# Patient Record
Sex: MALE | Race: WHITE | NOT HISPANIC OR LATINO
[De-identification: names, ages, dates, MRNs, and addresses within clinical notes are randomized per-mention and may not be internally consistent; named-entity substitution may affect disease eponyms.]

---

## 2017-06-01 ENCOUNTER — APPOINTMENT (OUTPATIENT)
Dept: CARDIOLOGY | Facility: CLINIC | Age: 67
End: 2017-06-01

## 2017-06-01 VITALS
DIASTOLIC BLOOD PRESSURE: 80 MMHG | SYSTOLIC BLOOD PRESSURE: 137 MMHG | BODY MASS INDEX: 28.92 KG/M2 | HEIGHT: 70 IN | WEIGHT: 202 LBS | OXYGEN SATURATION: 96 % | HEART RATE: 62 BPM

## 2017-06-02 ENCOUNTER — NON-APPOINTMENT (OUTPATIENT)
Age: 67
End: 2017-06-02

## 2017-09-04 ENCOUNTER — TRANSCRIPTION ENCOUNTER (OUTPATIENT)
Age: 67
End: 2017-09-04

## 2017-11-30 ENCOUNTER — APPOINTMENT (OUTPATIENT)
Dept: CARDIOLOGY | Facility: CLINIC | Age: 67
End: 2017-11-30
Payer: MEDICARE

## 2017-11-30 VITALS
BODY MASS INDEX: 29.35 KG/M2 | OXYGEN SATURATION: 98 % | HEIGHT: 70 IN | SYSTOLIC BLOOD PRESSURE: 147 MMHG | WEIGHT: 205 LBS | HEART RATE: 54 BPM | DIASTOLIC BLOOD PRESSURE: 87 MMHG

## 2017-11-30 PROCEDURE — 93000 ELECTROCARDIOGRAM COMPLETE: CPT

## 2017-11-30 PROCEDURE — 99214 OFFICE O/P EST MOD 30 MIN: CPT | Mod: 25

## 2017-11-30 RX ORDER — TELMISARTAN 40 MG/1
40 TABLET ORAL DAILY
Refills: 0 | Status: ACTIVE | COMMUNITY

## 2017-12-06 ENCOUNTER — NON-APPOINTMENT (OUTPATIENT)
Age: 67
End: 2017-12-06

## 2018-06-07 ENCOUNTER — APPOINTMENT (OUTPATIENT)
Dept: CARDIOLOGY | Facility: CLINIC | Age: 68
End: 2018-06-07
Payer: MEDICARE

## 2018-06-07 VITALS — HEART RATE: 70 BPM | OXYGEN SATURATION: 97 % | DIASTOLIC BLOOD PRESSURE: 75 MMHG | SYSTOLIC BLOOD PRESSURE: 130 MMHG

## 2018-06-07 VITALS — HEIGHT: 70 IN | BODY MASS INDEX: 29.49 KG/M2 | WEIGHT: 206 LBS

## 2018-06-07 PROCEDURE — 93000 ELECTROCARDIOGRAM COMPLETE: CPT

## 2018-06-07 PROCEDURE — 99214 OFFICE O/P EST MOD 30 MIN: CPT | Mod: 25

## 2018-06-12 ENCOUNTER — NON-APPOINTMENT (OUTPATIENT)
Age: 68
End: 2018-06-12

## 2018-11-16 ENCOUNTER — APPOINTMENT (OUTPATIENT)
Dept: CARDIOLOGY | Facility: CLINIC | Age: 68
End: 2018-11-16

## 2018-12-05 ENCOUNTER — APPOINTMENT (OUTPATIENT)
Dept: CARDIOLOGY | Facility: CLINIC | Age: 68
End: 2018-12-05
Payer: MEDICARE

## 2018-12-05 PROCEDURE — 93306 TTE W/DOPPLER COMPLETE: CPT

## 2018-12-06 ENCOUNTER — APPOINTMENT (OUTPATIENT)
Dept: CARDIOLOGY | Facility: CLINIC | Age: 68
End: 2018-12-06

## 2019-02-10 ENCOUNTER — EMERGENCY (EMERGENCY)
Facility: HOSPITAL | Age: 69
LOS: 1 days | Discharge: HOME | End: 2019-02-10
Attending: EMERGENCY MEDICINE | Admitting: EMERGENCY MEDICINE
Payer: MEDICARE

## 2019-02-10 VITALS
SYSTOLIC BLOOD PRESSURE: 166 MMHG | TEMPERATURE: 98 F | DIASTOLIC BLOOD PRESSURE: 100 MMHG | HEART RATE: 68 BPM | OXYGEN SATURATION: 98 % | RESPIRATION RATE: 18 BRPM

## 2019-02-10 VITALS
WEIGHT: 199.96 LBS | OXYGEN SATURATION: 99 % | DIASTOLIC BLOOD PRESSURE: 85 MMHG | RESPIRATION RATE: 14 BRPM | HEIGHT: 71 IN | SYSTOLIC BLOOD PRESSURE: 159 MMHG | HEART RATE: 67 BPM | TEMPERATURE: 97 F

## 2019-02-10 PROCEDURE — 99283 EMERGENCY DEPT VISIT LOW MDM: CPT

## 2019-02-10 RX ORDER — OFLOXACIN 0.3 %
2 DROPS OPHTHALMIC (EYE) ONCE
Qty: 0 | Refills: 0 | Status: DISCONTINUED | OUTPATIENT
Start: 2019-02-10 | End: 2019-02-14

## 2019-02-10 NOTE — ED PROVIDER NOTE - EYES, MLM
Clear bilaterally, pupils equal, round and reactive to light. EOM intact. fundoscopically normal. negative sidels. fluorescein exam is normal with no foreign body noted.

## 2019-02-10 NOTE — ED ADULT NURSE NOTE - CHPI ED NUR SYMPTOMS NEG
no eye lid swelling/no itching/no purulent drainage/no discharge/no double vision/no drainage/no photophobia/no blurred vision

## 2019-02-10 NOTE — ED PROVIDER NOTE - OBJECTIVE STATEMENT
67 y/o M pt presents to the ED c/o foreign body sensation in the left eye since yesterday afternoon. Pt states that he is a  and he was grinding steel yesterday. He was wearing safety glasses at the time. He states that the sensation has improved since yesterday. Similar sx in the past with metal foreign body in the eye. Denies decrease in vision or discharge from the eye. No other complaints at this time.

## 2019-02-10 NOTE — ED ADULT NURSE NOTE - OBJECTIVE STATEMENT
piece of metal to left eye h/o above multiple times in past redder yesterday did not see anything in eye but with pains and feels as if something there minimal redness no visual deficits no gross f.b. seen

## 2019-02-10 NOTE — ED PROVIDER NOTE - MEDICAL DECISION MAKING DETAILS
Foreign body in eye yesterday. PE - no fb visualized. Cornea normal. Plan - Ocuflox, Ophth fu tomorrow.

## 2019-04-09 ENCOUNTER — APPOINTMENT (OUTPATIENT)
Dept: CARDIOLOGY | Facility: CLINIC | Age: 69
End: 2019-04-09

## 2019-04-09 ENCOUNTER — NON-APPOINTMENT (OUTPATIENT)
Age: 69
End: 2019-04-09

## 2019-04-09 ENCOUNTER — APPOINTMENT (OUTPATIENT)
Dept: CARDIOLOGY | Facility: CLINIC | Age: 69
End: 2019-04-09
Payer: MEDICARE

## 2019-04-09 VITALS
HEIGHT: 70 IN | HEART RATE: 17 BPM | OXYGEN SATURATION: 99 % | WEIGHT: 227 LBS | DIASTOLIC BLOOD PRESSURE: 85 MMHG | SYSTOLIC BLOOD PRESSURE: 171 MMHG | BODY MASS INDEX: 32.5 KG/M2

## 2019-04-09 VITALS — DIASTOLIC BLOOD PRESSURE: 98 MMHG | SYSTOLIC BLOOD PRESSURE: 154 MMHG

## 2019-04-09 DIAGNOSIS — N21.0 CALCULUS IN BLADDER: ICD-10-CM

## 2019-04-09 DIAGNOSIS — I25.84 ATHEROSCLEROTIC HEART DISEASE OF NATIVE CORONARY ARTERY W/OUT ANGINA PECTORIS: ICD-10-CM

## 2019-04-09 DIAGNOSIS — Z86.79 PERSONAL HISTORY OF OTHER DISEASES OF THE CIRCULATORY SYSTEM: ICD-10-CM

## 2019-04-09 DIAGNOSIS — I77.810 THORACIC AORTIC ECTASIA: ICD-10-CM

## 2019-04-09 DIAGNOSIS — I10 ESSENTIAL (PRIMARY) HYPERTENSION: ICD-10-CM

## 2019-04-09 DIAGNOSIS — I25.10 ATHEROSCLEROTIC HEART DISEASE OF NATIVE CORONARY ARTERY W/OUT ANGINA PECTORIS: ICD-10-CM

## 2019-04-09 DIAGNOSIS — I35.1 NONRHEUMATIC AORTIC (VALVE) INSUFFICIENCY: ICD-10-CM

## 2019-04-09 DIAGNOSIS — E66.9 OBESITY, UNSPECIFIED: ICD-10-CM

## 2019-04-09 PROCEDURE — 93000 ELECTROCARDIOGRAM COMPLETE: CPT

## 2019-04-09 PROCEDURE — 99214 OFFICE O/P EST MOD 30 MIN: CPT | Mod: 25

## 2019-04-09 NOTE — HISTORY OF PRESENT ILLNESS
[FreeTextEntry1] : Puh Hearn is a 69-year-old man with a history of hypertension, aortic regurgitation, dilated aortic root, coronary artery calcification, who returns for routine cardiac examination - his last visit was with my associate, Dr. Fraire, in June 2018.  He continues to feel well and offers no complaints. He describes regular and intense aerobic exercise.  He has not been experiencing angina, dyspnea, or palpitations.

## 2019-04-09 NOTE — REASON FOR VISIT
[Follow-Up - Clinic] : a clinic follow-up of [Hypertension] : hypertension [Medication Management] : Medication management [FreeTextEntry1] : aortic insufficiency

## 2019-04-09 NOTE — PHYSICAL EXAM
[FreeTextEntry1] : Obese [Eyelids - No Xanthelasma] : the eyelids demonstrated no xanthelasmas [No Oral Cyanosis] : no oral cyanosis [] : no respiratory distress [Respiration, Rhythm And Depth] : normal respiratory rhythm and effort [Heart Rate And Rhythm] : heart rate and rhythm were normal [Heart Sounds] : normal S1 and S2 [Abnormal Walk] : normal gait [Edema] : no peripheral edema present [Nail Clubbing] : no clubbing of the fingernails [Cyanosis, Localized] : no localized cyanosis [Oriented To Time, Place, And Person] : oriented to person, place, and time [Impaired Insight] : insight and judgment were intact

## 2019-04-09 NOTE — REVIEW OF SYSTEMS
[Recent Weight Gain (___ Lbs)] : recent [unfilled] ~Ulb weight gain [Negative] : Endocrine [Headache] : no headache [Shortness Of Breath] : no shortness of breath [Dyspnea on exertion] : not dyspnea during exertion [Chest  Pressure] : no chest pressure [Chest Pain] : no chest pain [Palpitations] : no palpitations

## 2019-06-11 ENCOUNTER — EMERGENCY (EMERGENCY)
Facility: HOSPITAL | Age: 69
LOS: 1 days | Discharge: HOME | End: 2019-06-11
Attending: EMERGENCY MEDICINE | Admitting: EMERGENCY MEDICINE
Payer: MEDICARE

## 2019-06-11 VITALS
WEIGHT: 210.1 LBS | SYSTOLIC BLOOD PRESSURE: 144 MMHG | HEART RATE: 65 BPM | OXYGEN SATURATION: 97 % | TEMPERATURE: 98 F | DIASTOLIC BLOOD PRESSURE: 82 MMHG | RESPIRATION RATE: 18 BRPM | HEIGHT: 70 IN

## 2019-06-11 VITALS
TEMPERATURE: 98 F | OXYGEN SATURATION: 99 % | SYSTOLIC BLOOD PRESSURE: 135 MMHG | HEART RATE: 71 BPM | RESPIRATION RATE: 20 BRPM | DIASTOLIC BLOOD PRESSURE: 89 MMHG

## 2019-06-11 LAB
ANION GAP SERPL CALC-SCNC: 9 MMOL/L — SIGNIFICANT CHANGE UP (ref 5–17)
BUN SERPL-MCNC: 30 MG/DL — HIGH (ref 7–23)
CALCIUM SERPL-MCNC: 8.6 MG/DL — SIGNIFICANT CHANGE UP (ref 8.4–10.5)
CHLORIDE SERPL-SCNC: 106 MMOL/L — SIGNIFICANT CHANGE UP (ref 96–108)
CO2 SERPL-SCNC: 26 MMOL/L — SIGNIFICANT CHANGE UP (ref 22–31)
CREAT SERPL-MCNC: 1.18 MG/DL — SIGNIFICANT CHANGE UP (ref 0.5–1.3)
GLUCOSE SERPL-MCNC: 134 MG/DL — HIGH (ref 70–99)
HCT VFR BLD CALC: 37.9 % — LOW (ref 39–50)
HGB BLD-MCNC: 13.5 G/DL — SIGNIFICANT CHANGE UP (ref 13–17)
MAGNESIUM SERPL-MCNC: 2.1 MG/DL — SIGNIFICANT CHANGE UP (ref 1.6–2.6)
MCHC RBC-ENTMCNC: 33.9 PG — SIGNIFICANT CHANGE UP (ref 27–34)
MCHC RBC-ENTMCNC: 35.6 GM/DL — SIGNIFICANT CHANGE UP (ref 32–36)
MCV RBC AUTO: 95.2 FL — SIGNIFICANT CHANGE UP (ref 80–100)
NRBC # BLD: 0 /100 WBCS — SIGNIFICANT CHANGE UP (ref 0–0)
PLATELET # BLD AUTO: 183 K/UL — SIGNIFICANT CHANGE UP (ref 150–400)
POTASSIUM SERPL-MCNC: 4.2 MMOL/L — SIGNIFICANT CHANGE UP (ref 3.5–5.3)
POTASSIUM SERPL-SCNC: 4.2 MMOL/L — SIGNIFICANT CHANGE UP (ref 3.5–5.3)
RBC # BLD: 3.98 M/UL — LOW (ref 4.2–5.8)
RBC # FLD: 12.2 % — SIGNIFICANT CHANGE UP (ref 10.3–14.5)
SODIUM SERPL-SCNC: 141 MMOL/L — SIGNIFICANT CHANGE UP (ref 135–145)
TROPONIN I SERPL-MCNC: 0 NG/ML — LOW (ref 0.02–0.06)
WBC # BLD: 5.77 K/UL — SIGNIFICANT CHANGE UP (ref 3.8–10.5)
WBC # FLD AUTO: 5.77 K/UL — SIGNIFICANT CHANGE UP (ref 3.8–10.5)

## 2019-06-11 PROCEDURE — 99283 EMERGENCY DEPT VISIT LOW MDM: CPT | Mod: 25

## 2019-06-11 PROCEDURE — 99284 EMERGENCY DEPT VISIT MOD MDM: CPT

## 2019-06-11 PROCEDURE — 83735 ASSAY OF MAGNESIUM: CPT

## 2019-06-11 PROCEDURE — 80048 BASIC METABOLIC PNL TOTAL CA: CPT

## 2019-06-11 PROCEDURE — 85027 COMPLETE CBC AUTOMATED: CPT

## 2019-06-11 PROCEDURE — 36415 COLL VENOUS BLD VENIPUNCTURE: CPT

## 2019-06-11 PROCEDURE — 93005 ELECTROCARDIOGRAM TRACING: CPT

## 2019-06-11 PROCEDURE — 84484 ASSAY OF TROPONIN QUANT: CPT

## 2019-06-11 PROCEDURE — 93010 ELECTROCARDIOGRAM REPORT: CPT

## 2019-06-11 RX ORDER — SODIUM CHLORIDE 9 MG/ML
1000 INJECTION INTRAMUSCULAR; INTRAVENOUS; SUBCUTANEOUS ONCE
Refills: 0 | Status: COMPLETED | OUTPATIENT
Start: 2019-06-11 | End: 2019-06-11

## 2019-06-11 RX ADMIN — SODIUM CHLORIDE 1000 MILLILITER(S): 9 INJECTION INTRAMUSCULAR; INTRAVENOUS; SUBCUTANEOUS at 01:50

## 2019-06-11 RX ADMIN — SODIUM CHLORIDE 1000 MILLILITER(S): 9 INJECTION INTRAMUSCULAR; INTRAVENOUS; SUBCUTANEOUS at 01:10

## 2019-06-11 NOTE — ED PROVIDER NOTE - OBJECTIVE STATEMENT
69 y.o. M c/o episode of pre-syncope - states recently had extensive cardiac work up including CTA which showed stenosis, but nuclear medicine stress was normal - in a couple of days having TURP procedure 69 y.o. M c/o episode of pre-syncope - states recently had extensive cardiac work up including CTA which showed stenosis, but nuclear medicine stress was normal - in a couple of days having TURP procedure - has been working a lot of hours, states "burning the candle at both ends" drives a tow truck, has been up since 5am, didn't eat/drink much today, tonight on scene for a tow felt a little lightheaded, when EMS arrived felt better, but wanted to be safe and get checked out, no loc, no cp, no sob, no n/v, no abd pain, says last time this happened he was dehydrated

## 2019-06-11 NOTE — ED ADULT NURSE NOTE - OBJECTIVE STATEMENT
Pt brought to the ED after not feeling well while responding to an accident scene. Pt states he is a  and has been working many hours with little sleep in between. Pt reports feeling woozy after bending over and then standing up. Pt denies any CP, no diff breathing, thinks he may be dehydrated. Pt is awake, alert, and conversant with clear speech. Feeling better now.

## 2019-06-11 NOTE — ED PROVIDER NOTE - CLINICAL SUMMARY MEDICAL DECISION MAKING FREE TEXT BOX
69 y.o. M with brief episode lightheadedness, very recent cardiac work up with negative NM stress, has had a lot of stress with upcoming surgery (), decreased sleep, poor eating/hydrating today, very long work shift today - likely episode related, will hydrate, check ekg, labs and reassess

## 2019-06-26 ENCOUNTER — OUTPATIENT (OUTPATIENT)
Dept: OUTPATIENT SERVICES | Facility: HOSPITAL | Age: 69
LOS: 1 days | End: 2019-06-26
Payer: MEDICARE

## 2019-06-26 ENCOUNTER — APPOINTMENT (OUTPATIENT)
Dept: ULTRASOUND IMAGING | Facility: CLINIC | Age: 69
End: 2019-06-26
Payer: MEDICARE

## 2019-06-26 DIAGNOSIS — Z00.8 ENCOUNTER FOR OTHER GENERAL EXAMINATION: ICD-10-CM

## 2019-06-26 PROBLEM — I10 ESSENTIAL (PRIMARY) HYPERTENSION: Chronic | Status: ACTIVE | Noted: 2019-06-11

## 2019-06-26 PROBLEM — N40.0 BENIGN PROSTATIC HYPERPLASIA WITHOUT LOWER URINARY TRACT SYMPTOMS: Chronic | Status: ACTIVE | Noted: 2019-06-11

## 2019-06-26 PROBLEM — N21.0 CALCULUS IN BLADDER: Chronic | Status: ACTIVE | Noted: 2019-06-11

## 2019-06-26 PROCEDURE — 93880 EXTRACRANIAL BILAT STUDY: CPT

## 2019-06-26 PROCEDURE — 93880 EXTRACRANIAL BILAT STUDY: CPT | Mod: 26

## 2019-10-08 ENCOUNTER — APPOINTMENT (OUTPATIENT)
Dept: CARDIOLOGY | Facility: CLINIC | Age: 69
End: 2019-10-08

## 2019-11-05 NOTE — ED PROVIDER NOTE - CROS ED EYES ALL NEG
- - - As certified below, I, or a nurse practitioner or physician assistant working with me, had a face-to-face encounter that meets the physician face-to-face encounter requirements.

## 2019-11-08 NOTE — DISCUSSION/SUMMARY
INDICATION:  Fall playing football.  Right hand and wrist pain. 



RIGHT HAND:  Three views of the right hand revealed slight deformity at the 5th 
metacarpal, compatible with a previous healed boxers type fracture.



Very minimal degenerative change is noted at the 2nd metacarpophalangeal joint 
and at the 3rd metacarpophalangeal joint to an even lesser extent.



No other bone or joint abnormality was identified - no acute fracture or 
dislocation was seen.



IMPRESSION:  No acute fracture or dislocation.

MTDD [___ Month(s)] : [unfilled] month(s) [With ___] : with [unfilled] [FreeTextEntry1] : \par Hypertension: Blood pressure markedly elevated when measured in the office today but reportedly well controlled on home blood pressure monitoring; given the  presence of dilated aortic root and ascending aorta, I told the patient that achievement of blood pressure goal is critically important; I recommended 24-hour and with her blood pressure monitor - patient agreeable; in the interim, continue current antihypertensive regimen.\par \par Dilated aortic root and ascending aorta:  Mild; periodic imaging anticipated; optimize blood pressure.\par \par Coronary artery calcification:  Asymptomatic; refuses statin.\par \par Aortic regurgitation: Mild.\par \par Obese: We discussed the importance of weight loss and diet.

## 2019-12-13 ENCOUNTER — TRANSCRIPTION ENCOUNTER (OUTPATIENT)
Age: 69
End: 2019-12-13

## 2020-12-27 ENCOUNTER — EMERGENCY (EMERGENCY)
Facility: HOSPITAL | Age: 70
LOS: 1 days | Discharge: HOME | End: 2020-12-27
Attending: EMERGENCY MEDICINE | Admitting: EMERGENCY MEDICINE
Payer: MEDICARE

## 2020-12-27 VITALS
WEIGHT: 190.04 LBS | DIASTOLIC BLOOD PRESSURE: 100 MMHG | RESPIRATION RATE: 18 BRPM | HEART RATE: 73 BPM | SYSTOLIC BLOOD PRESSURE: 160 MMHG | TEMPERATURE: 97 F | HEIGHT: 70 IN | OXYGEN SATURATION: 99 %

## 2020-12-27 PROCEDURE — 99283 EMERGENCY DEPT VISIT LOW MDM: CPT

## 2020-12-27 PROCEDURE — 73140 X-RAY EXAM OF FINGER(S): CPT | Mod: 26,LT

## 2020-12-27 PROCEDURE — 73140 X-RAY EXAM OF FINGER(S): CPT

## 2020-12-27 RX ORDER — METOPROLOL TARTRATE 50 MG
1 TABLET ORAL
Qty: 0 | Refills: 0 | DISCHARGE

## 2020-12-27 RX ORDER — TELMISARTAN 20 MG/1
1 TABLET ORAL
Qty: 0 | Refills: 0 | DISCHARGE

## 2020-12-27 NOTE — ED PROCEDURE NOTE - PROCEDURE ADDITIONAL DETAILS
removed FB - pt winter well. No palpated fb remaining.  Dw pt risk of occult retained fb and importance of fu with hand this week

## 2020-12-27 NOTE — ED PROVIDER NOTE - PHYSICAL EXAMINATION
L 2nd digit with puncture in radial aspect of prox 2nd digit, ulnar aspect with small piece of wood impaled into finger. Nl dist str/sens equal bl, nl cap refill. Nl rest of hand

## 2020-12-27 NOTE — ED PROVIDER NOTE - PATIENT PORTAL LINK FT
You can access the FollowMyHealth Patient Portal offered by [facility] by registering at the following website: [email or web address]. By joining [employer]’s FollowMyHealth portal, you will also be able to view your health information using other applications (apps) compatible with our system.

## 2020-12-27 NOTE — ED PROVIDER NOTE - NSFOLLOWUPINSTRUCTIONS_ED_ALL_ED_FT
1) Follow-up with your Primary Medical Doctor or referred doctor. Call today / next business day for prompt follow-up.  2) Return to Emergency room for any worsening or persistent pain, weakness, fever, or any other concerning symptoms.  3) See attached instruction sheets for additional information, including information regarding signs and symptoms to look out for, reasons to seek immediate care and other important instructions.  4) Follow-up with Hand surgeon - call tomorrow for prompt follow-up  5) 1) Follow-up with your Primary Medical Doctor or referred doctor. Call today / next business day for prompt follow-up.  2) Return to Emergency room for any worsening or persistent pain, weakness, fever, or any other concerning symptoms.  3) See attached instruction sheets for additional information, including information regarding signs and symptoms to look out for, reasons to seek immediate care and other important instructions.  4) Follow-up with Hand surgeon, Dr Baugh  - tomorrow in the office at 930am  5) Augmentin twice daily for 7 days

## 2020-12-27 NOTE — ED ADULT NURSE NOTE - NSIMPLEMENTINTERV_GEN_ALL_ED
Implemented All Universal Safety Interventions:  [place] to call system. Call bell, personal items and telephone within reach. Instruct patient to call for assistance. Room bathroom lighting operational. Non-slip footwear when patient is off stretcher. Physically safe environment: no spills, clutter or unnecessary equipment. Stretcher in lowest position, wheels locked, appropriate side rails in place.

## 2020-12-27 NOTE — ED PROVIDER NOTE - OBJECTIVE STATEMENT
69 y/o male with a PMHx of BPH, HTN, presents to the ED c/o L 2nd digit pain. Pt states a splinter was stuck in L 2nd digit and pt removed the splinter but still feels like a piece of the splinter is still stuck. Pt came to the ED for further eval. Unknown last Tetanus. No hx of COVID-19 or known exposure to COVID. Non smoker.

## 2020-12-27 NOTE — ED ADULT NURSE REASSESSMENT NOTE - NS ED NURSE REASSESS COMMENT FT1
wound cleaned and bacitracin and dsd applied  pt re evaluated by md and to be d'c/d  pt discharged stable and ambulatory in nad at present d/c instruction reinforced and pt verbalized understanding   pt explained how to care for wound and advised on signs of infection which include redness, swelling ,fever, pus discharge and /or pain and if any symptoms occur to return to emergency room for evaluation and pt verbalized understanding  pt offered and declined tdap vaccine

## 2020-12-27 NOTE — ED PROVIDER NOTE - PROGRESS NOTE DETAILS
1% lido digital block, removed small piece of splinter - whole. with no palpated Fb remaining. Dw Dr Olmstead - will see pt in office tomorrow at 930a Dw pt re importance of close prompt fu with hand surgeon to ensure no fb remaining. Pt will fu with him in am as discussed. Dw pt re infxn prec/ inst as well. Pt also refused tetanus vaccination - aware rba

## 2021-10-25 ENCOUNTER — TRANSCRIPTION ENCOUNTER (OUTPATIENT)
Age: 71
End: 2021-10-25

## 2021-12-13 ENCOUNTER — EMERGENCY (EMERGENCY)
Facility: HOSPITAL | Age: 71
LOS: 1 days | Discharge: HOME | End: 2021-12-13
Attending: EMERGENCY MEDICINE | Admitting: EMERGENCY MEDICINE
Payer: MEDICARE

## 2021-12-13 VITALS
SYSTOLIC BLOOD PRESSURE: 106 MMHG | TEMPERATURE: 98 F | OXYGEN SATURATION: 99 % | RESPIRATION RATE: 15 BRPM | DIASTOLIC BLOOD PRESSURE: 84 MMHG | HEART RATE: 98 BPM

## 2021-12-13 VITALS
SYSTOLIC BLOOD PRESSURE: 128 MMHG | HEART RATE: 120 BPM | DIASTOLIC BLOOD PRESSURE: 90 MMHG | WEIGHT: 210.98 LBS | TEMPERATURE: 98 F | RESPIRATION RATE: 20 BRPM | OXYGEN SATURATION: 98 % | HEIGHT: 70 IN

## 2021-12-13 PROCEDURE — 99282 EMERGENCY DEPT VISIT SF MDM: CPT

## 2021-12-13 RX ORDER — HYDROCHLOROTHIAZIDE 25 MG
0 TABLET ORAL
Qty: 0 | Refills: 0 | DISCHARGE

## 2021-12-13 RX ORDER — TELMISARTAN 20 MG/1
1 TABLET ORAL
Qty: 0 | Refills: 0 | DISCHARGE

## 2021-12-13 RX ORDER — METOPROLOL TARTRATE 50 MG
1 TABLET ORAL
Qty: 0 | Refills: 0 | DISCHARGE

## 2021-12-13 NOTE — ED PROVIDER NOTE - PATIENT PORTAL LINK FT
You can access the FollowMyHealth Patient Portal offered by [facility] by registering at the following website: [email or web address]. By joining [employer]’s FollowMyHealth portal, you will also be able to view your health information using other applications (apps) compatible with our system.

## 2021-12-13 NOTE — ED ADULT NURSE NOTE - OBJECTIVE STATEMENT
72yo male walked into ED, pt c/o "I slipped on some leafs and I fell to the ground" as per pt. pt noted with hematoma to left hip. pt denies LOC, head , neck or back pain.

## 2021-12-13 NOTE — ED PROVIDER NOTE - OBJECTIVE STATEMENT
71 y.o. M fell onto left buttock yesterday at 1630, has large swelling/bruise left lateral buttock area, denies hitting head, denies other injury 71 y.o. M fell onto left buttock yesterday at 1630, has large swelling/bruise left lateral buttock area, denies hitting head, denies neck/back injury, no other injury, no paresthesias, has been icing, took tylenol, not on blood thinners, feels over the last couple of hours that the bruise is a little easier, worried about compartment syndrome, denies cp/sob/lightheadedness

## 2021-12-13 NOTE — ED PROVIDER NOTE - CLINICAL SUMMARY MEDICAL DECISION MAKING FREE TEXT BOX
hematoma left buttock - no sign bony injury as bearing weight without difficulty and good ROM, pt has no s/s c/w acute blood loss, discussed doing labs and cta to evaluate for continued active bleeding and possible IR intervention, pt refuses, discussed to continue tylenol and icing, return for worsening/urgent concerns

## 2021-12-13 NOTE — ED ADULT TRIAGE NOTE - BP NONINVASIVE SYSTOLIC (MM HG)
As per EMS "pt was complaining of difficulty breathing and family said pt was in respiratory distress, his life vest shocked him twice prior to arrival, when he got in the ambulance it shocked him two more times, and I shocked him once when he went into vfib", pt rec'd 6 epi and 1 bicarb prior to arrival, pt intubated with 7.5 tube, pt arrived with noreen in place and in PEA, pt has IO in left tibial and left humeral, code team initiated 128

## 2021-12-13 NOTE — ED ADULT TRIAGE NOTE - HEART RATE (BEATS/MIN)
Patient Information     Patient Name Umair Lopes [id number] Male 2004      Nursing Note by Angelika King at 3/10/2017  7:00 PM     Author:  Angelika King Service:  (none) Author Type:  NURS- Student Practical Nurse     Filed:  3/10/2017  7:25 PM Encounter Date:  3/10/2017 Status:  Signed     :  Angelika King (NURS- Student Practical Nurse)            Patient presents with cough nonproductive, loss of voice for 1 week. Angelika King LPN .............3/10/2017  7:18 PM          
Patient being placed in knee immobilizer by ortho.
120

## 2022-05-10 ENCOUNTER — APPOINTMENT (OUTPATIENT)
Dept: ULTRASOUND IMAGING | Facility: CLINIC | Age: 72
End: 2022-05-10
Payer: MEDICARE

## 2022-05-10 ENCOUNTER — OUTPATIENT (OUTPATIENT)
Dept: OUTPATIENT SERVICES | Facility: HOSPITAL | Age: 72
LOS: 1 days | End: 2022-05-10
Payer: MEDICARE

## 2022-05-10 DIAGNOSIS — Z00.8 ENCOUNTER FOR OTHER GENERAL EXAMINATION: ICD-10-CM

## 2022-05-10 PROCEDURE — 76536 US EXAM OF HEAD AND NECK: CPT | Mod: 26

## 2022-05-10 PROCEDURE — 76536 US EXAM OF HEAD AND NECK: CPT

## 2022-05-24 ENCOUNTER — APPOINTMENT (OUTPATIENT)
Dept: ULTRASOUND IMAGING | Facility: IMAGING CENTER | Age: 72
End: 2022-05-24
Payer: MEDICARE

## 2022-05-24 ENCOUNTER — OUTPATIENT (OUTPATIENT)
Dept: OUTPATIENT SERVICES | Facility: HOSPITAL | Age: 72
LOS: 1 days | End: 2022-05-24
Payer: MEDICARE

## 2022-05-24 ENCOUNTER — RESULT REVIEW (OUTPATIENT)
Age: 72
End: 2022-05-24

## 2022-05-24 DIAGNOSIS — Z00.8 ENCOUNTER FOR OTHER GENERAL EXAMINATION: ICD-10-CM

## 2022-05-24 DIAGNOSIS — E04.1 NONTOXIC SINGLE THYROID NODULE: ICD-10-CM

## 2022-05-24 PROCEDURE — 88173 CYTOPATH EVAL FNA REPORT: CPT | Mod: 26

## 2022-05-24 PROCEDURE — 10005 FNA BX W/US GDN 1ST LES: CPT

## 2022-05-24 PROCEDURE — 88172 CYTP DX EVAL FNA 1ST EA SITE: CPT

## 2022-05-24 PROCEDURE — 88173 CYTOPATH EVAL FNA REPORT: CPT

## 2022-05-25 LAB — NON-GYNECOLOGICAL CYTOLOGY STUDY: SIGNIFICANT CHANGE UP

## 2022-11-10 NOTE — ED ADULT NURSE NOTE - PRO INTERPRETER NEED 2
Group Topic:  Coping Skills Education    Date: 11/10/2022  Start Time: 1300  End Time: 1345  Facilitators: Michael Alba    Pt was recruited for group but did not attend. Efforts to encourage participation in programming on the unit will continue.  Michael Alba LPC  Coping skills #1 and coping skills #2       English

## 2023-10-16 ENCOUNTER — APPOINTMENT (OUTPATIENT)
Dept: ULTRASOUND IMAGING | Facility: CLINIC | Age: 73
End: 2023-10-16
Payer: MEDICARE

## 2023-10-16 ENCOUNTER — OUTPATIENT (OUTPATIENT)
Dept: OUTPATIENT SERVICES | Facility: HOSPITAL | Age: 73
LOS: 1 days | End: 2023-10-16
Payer: MEDICARE

## 2023-10-16 DIAGNOSIS — E67.3 HYPERVITAMINOSIS D: ICD-10-CM

## 2023-10-16 DIAGNOSIS — E04.1 NONTOXIC SINGLE THYROID NODULE: ICD-10-CM

## 2023-10-16 PROCEDURE — 76536 US EXAM OF HEAD AND NECK: CPT

## 2023-10-16 PROCEDURE — 76536 US EXAM OF HEAD AND NECK: CPT | Mod: 26

## 2024-12-26 ENCOUNTER — APPOINTMENT (OUTPATIENT)
Dept: ULTRASOUND IMAGING | Facility: CLINIC | Age: 74
End: 2024-12-26
Payer: MEDICARE

## 2024-12-26 ENCOUNTER — OUTPATIENT (OUTPATIENT)
Dept: OUTPATIENT SERVICES | Facility: HOSPITAL | Age: 74
LOS: 1 days | End: 2024-12-26
Payer: MEDICARE

## 2024-12-26 DIAGNOSIS — E03.9 HYPOTHYROIDISM, UNSPECIFIED: ICD-10-CM

## 2024-12-26 DIAGNOSIS — E67.3 HYPERVITAMINOSIS D: ICD-10-CM

## 2024-12-26 PROCEDURE — 76536 US EXAM OF HEAD AND NECK: CPT | Mod: 26

## 2024-12-26 PROCEDURE — 76536 US EXAM OF HEAD AND NECK: CPT
